# Patient Record
Sex: FEMALE | URBAN - METROPOLITAN AREA
[De-identification: names, ages, dates, MRNs, and addresses within clinical notes are randomized per-mention and may not be internally consistent; named-entity substitution may affect disease eponyms.]

---

## 2018-03-14 ENCOUNTER — EMERGENCY (EMERGENCY)
Facility: HOSPITAL | Age: 68
LOS: 1 days | Discharge: HOME | End: 2018-03-14
Attending: EMERGENCY MEDICINE
Payer: COMMERCIAL

## 2018-03-14 VITALS
SYSTOLIC BLOOD PRESSURE: 155 MMHG | TEMPERATURE: 98 F | OXYGEN SATURATION: 99 % | WEIGHT: 149.91 LBS | RESPIRATION RATE: 16 BRPM | HEART RATE: 81 BPM | DIASTOLIC BLOOD PRESSURE: 91 MMHG

## 2018-03-14 PROCEDURE — 99285 EMERGENCY DEPT VISIT HI MDM: CPT | Mod: 25

## 2018-03-15 VITALS
TEMPERATURE: 98 F | RESPIRATION RATE: 18 BRPM | SYSTOLIC BLOOD PRESSURE: 136 MMHG | HEART RATE: 80 BPM | DIASTOLIC BLOOD PRESSURE: 64 MMHG | OXYGEN SATURATION: 100 %

## 2018-03-15 LAB
ALBUMIN SERPL ELPH-MCNC: 3.7 G/DL — SIGNIFICANT CHANGE UP (ref 3.5–5)
ALP SERPL-CCNC: 65 U/L — SIGNIFICANT CHANGE UP (ref 40–120)
ALT FLD-CCNC: 16 U/L DA — SIGNIFICANT CHANGE UP (ref 10–60)
ANION GAP SERPL CALC-SCNC: 8 MMOL/L — SIGNIFICANT CHANGE UP (ref 5–17)
APTT BLD: 32.8 SEC — SIGNIFICANT CHANGE UP (ref 27.5–37.4)
AST SERPL-CCNC: 16 U/L — SIGNIFICANT CHANGE UP (ref 10–40)
BASOPHILS # BLD AUTO: 0.1 K/UL — SIGNIFICANT CHANGE UP (ref 0–0.2)
BASOPHILS NFR BLD AUTO: 0.8 % — SIGNIFICANT CHANGE UP (ref 0–2)
BILIRUB SERPL-MCNC: 0.2 MG/DL — SIGNIFICANT CHANGE UP (ref 0.2–1.2)
BUN SERPL-MCNC: 23 MG/DL — HIGH (ref 7–18)
CALCIUM SERPL-MCNC: 8.7 MG/DL — SIGNIFICANT CHANGE UP (ref 8.4–10.5)
CHLORIDE SERPL-SCNC: 105 MMOL/L — SIGNIFICANT CHANGE UP (ref 96–108)
CO2 SERPL-SCNC: 26 MMOL/L — SIGNIFICANT CHANGE UP (ref 22–31)
CREAT SERPL-MCNC: 0.91 MG/DL — SIGNIFICANT CHANGE UP (ref 0.5–1.3)
D DIMER BLD IA.RAPID-MCNC: 191 NG/ML DDU — SIGNIFICANT CHANGE UP
EOSINOPHIL # BLD AUTO: 0.1 K/UL — SIGNIFICANT CHANGE UP (ref 0–0.5)
EOSINOPHIL NFR BLD AUTO: 0.9 % — SIGNIFICANT CHANGE UP (ref 0–6)
GLUCOSE SERPL-MCNC: 102 MG/DL — HIGH (ref 70–99)
HCT VFR BLD CALC: 43.8 % — SIGNIFICANT CHANGE UP (ref 34.5–45)
HGB BLD-MCNC: 13.9 G/DL — SIGNIFICANT CHANGE UP (ref 11.5–15.5)
INR BLD: 1.06 RATIO — SIGNIFICANT CHANGE UP (ref 0.88–1.16)
LYMPHOCYTES # BLD AUTO: 1.6 K/UL — SIGNIFICANT CHANGE UP (ref 1–3.3)
LYMPHOCYTES # BLD AUTO: 22.7 % — SIGNIFICANT CHANGE UP (ref 13–44)
MCHC RBC-ENTMCNC: 29.1 PG — SIGNIFICANT CHANGE UP (ref 27–34)
MCHC RBC-ENTMCNC: 31.7 GM/DL — LOW (ref 32–36)
MCV RBC AUTO: 91.8 FL — SIGNIFICANT CHANGE UP (ref 80–100)
MONOCYTES # BLD AUTO: 0.5 K/UL — SIGNIFICANT CHANGE UP (ref 0–0.9)
MONOCYTES NFR BLD AUTO: 7.3 % — SIGNIFICANT CHANGE UP (ref 2–14)
NEUTROPHILS # BLD AUTO: 4.8 K/UL — SIGNIFICANT CHANGE UP (ref 1.8–7.4)
NEUTROPHILS NFR BLD AUTO: 68.2 % — SIGNIFICANT CHANGE UP (ref 43–77)
PLATELET # BLD AUTO: 198 K/UL — SIGNIFICANT CHANGE UP (ref 150–400)
POTASSIUM SERPL-MCNC: 4.1 MMOL/L — SIGNIFICANT CHANGE UP (ref 3.5–5.3)
POTASSIUM SERPL-SCNC: 4.1 MMOL/L — SIGNIFICANT CHANGE UP (ref 3.5–5.3)
PROT SERPL-MCNC: 7.6 G/DL — SIGNIFICANT CHANGE UP (ref 6–8.3)
PROTHROM AB SERPL-ACNC: 11.6 SEC — SIGNIFICANT CHANGE UP (ref 9.8–12.7)
RBC # BLD: 4.77 M/UL — SIGNIFICANT CHANGE UP (ref 3.8–5.2)
RBC # FLD: 12.7 % — SIGNIFICANT CHANGE UP (ref 10.3–14.5)
SODIUM SERPL-SCNC: 139 MMOL/L — SIGNIFICANT CHANGE UP (ref 135–145)
TROPONIN I SERPL-MCNC: <0.015 NG/ML — SIGNIFICANT CHANGE UP (ref 0–0.04)
TROPONIN I SERPL-MCNC: <0.015 NG/ML — SIGNIFICANT CHANGE UP (ref 0–0.04)
WBC # BLD: 7 K/UL — SIGNIFICANT CHANGE UP (ref 3.8–10.5)
WBC # FLD AUTO: 7 K/UL — SIGNIFICANT CHANGE UP (ref 3.8–10.5)

## 2018-03-15 PROCEDURE — 85730 THROMBOPLASTIN TIME PARTIAL: CPT

## 2018-03-15 PROCEDURE — 71046 X-RAY EXAM CHEST 2 VIEWS: CPT | Mod: 26

## 2018-03-15 PROCEDURE — 84484 ASSAY OF TROPONIN QUANT: CPT

## 2018-03-15 PROCEDURE — 85610 PROTHROMBIN TIME: CPT

## 2018-03-15 PROCEDURE — 80053 COMPREHEN METABOLIC PANEL: CPT

## 2018-03-15 PROCEDURE — 99284 EMERGENCY DEPT VISIT MOD MDM: CPT | Mod: 25

## 2018-03-15 PROCEDURE — 85379 FIBRIN DEGRADATION QUANT: CPT

## 2018-03-15 PROCEDURE — 71046 X-RAY EXAM CHEST 2 VIEWS: CPT

## 2018-03-15 PROCEDURE — 85027 COMPLETE CBC AUTOMATED: CPT

## 2018-03-15 PROCEDURE — 93005 ELECTROCARDIOGRAM TRACING: CPT

## 2018-03-15 RX ORDER — SODIUM CHLORIDE 9 MG/ML
3 INJECTION INTRAMUSCULAR; INTRAVENOUS; SUBCUTANEOUS ONCE
Qty: 0 | Refills: 0 | Status: COMPLETED | OUTPATIENT
Start: 2018-03-15 | End: 2018-03-15

## 2018-03-15 RX ADMIN — Medication 30 MILLILITER(S): at 05:51

## 2018-03-15 RX ADMIN — SODIUM CHLORIDE 3 MILLILITER(S): 9 INJECTION INTRAMUSCULAR; INTRAVENOUS; SUBCUTANEOUS at 01:14

## 2018-03-15 NOTE — ED PROVIDER NOTE - PROGRESS NOTE DETAILS
labs show neg trop x2, ddimer unremarkable, CXR unremarkable  on reeval patient reports pain only briefly recurs with movement, also reports acid tast in mouth the last few days. Given maalox. Offered admission for stress testing since last one many years ago but patient prefers to f/u with PMD later today for cardiology/GI referrals. Pt stable for discharge.

## 2018-03-15 NOTE — ED PROVIDER NOTE - MEDICAL DECISION MAKING DETAILS
68 y/o F pt with H/O HTN presents with chest pain which has now resolved. Will obtain labs, EKG, CXR and reassess

## 2018-03-15 NOTE — ED PROVIDER NOTE - CHPI ED SYMPTOMS NEG
no fever, no chills, no shortness of breath, no cough, no dysuria, no urinary frequency, no hematuria, no numbness, no tingling, no weakness, no leg swelling, no calf pain

## 2018-03-15 NOTE — ED PROVIDER NOTE - OBJECTIVE STATEMENT
66 y/o F pt with PMHx of asthma, HTN, presents to ED c/o Left sided chest pain x tonight 1400 which then resolved spontaneously. Pt reports that 1.5 hours PTA pain recurred and noted it to be more painful with deep inspiration. Pt notes having chest congestion last week and has been using Provair with some relief of his sxs. As per pt, in the last few days he has felt nausea, upset stomach and lose stools. Pt denies fever, chills, shortness of breath, cough, dysuria, urinary frequency, hematuria, numbness, tingling, weakness leg swelling, calf pain, or any other complaints. NKDA.

## 2018-03-15 NOTE — ED ADULT NURSE REASSESSMENT NOTE - NS ED NURSE REASSESS COMMENT FT1
Pt. verbalized improvement. No signs of distress noted. o complaints voiced. Pt. is stable for discharge.

## 2019-09-26 ENCOUNTER — INPATIENT (INPATIENT)
Facility: HOSPITAL | Age: 69
LOS: 0 days | Discharge: ANOTHER HOSPITAL | DRG: 305 | End: 2019-09-27
Attending: INTERNAL MEDICINE | Admitting: INTERNAL MEDICINE
Payer: MEDICARE

## 2019-09-26 VITALS
RESPIRATION RATE: 16 BRPM | HEIGHT: 66 IN | WEIGHT: 145.95 LBS | TEMPERATURE: 98 F | HEART RATE: 72 BPM | OXYGEN SATURATION: 97 % | DIASTOLIC BLOOD PRESSURE: 95 MMHG | SYSTOLIC BLOOD PRESSURE: 167 MMHG

## 2019-09-26 LAB
ALBUMIN SERPL ELPH-MCNC: 3.9 G/DL — SIGNIFICANT CHANGE UP (ref 3.5–5)
ALP SERPL-CCNC: 65 U/L — SIGNIFICANT CHANGE UP (ref 40–120)
ALT FLD-CCNC: 21 U/L DA — SIGNIFICANT CHANGE UP (ref 10–60)
ANION GAP SERPL CALC-SCNC: 5 MMOL/L — SIGNIFICANT CHANGE UP (ref 5–17)
AST SERPL-CCNC: 19 U/L — SIGNIFICANT CHANGE UP (ref 10–40)
BASOPHILS # BLD AUTO: 0.02 K/UL — SIGNIFICANT CHANGE UP (ref 0–0.2)
BASOPHILS NFR BLD AUTO: 0.3 % — SIGNIFICANT CHANGE UP (ref 0–2)
BILIRUB SERPL-MCNC: 0.3 MG/DL — SIGNIFICANT CHANGE UP (ref 0.2–1.2)
BUN SERPL-MCNC: 16 MG/DL — SIGNIFICANT CHANGE UP (ref 7–18)
CALCIUM SERPL-MCNC: 9.3 MG/DL — SIGNIFICANT CHANGE UP (ref 8.4–10.5)
CHLORIDE SERPL-SCNC: 105 MMOL/L — SIGNIFICANT CHANGE UP (ref 96–108)
CK SERPL-CCNC: 134 U/L — SIGNIFICANT CHANGE UP (ref 21–215)
CO2 SERPL-SCNC: 31 MMOL/L — SIGNIFICANT CHANGE UP (ref 22–31)
CREAT SERPL-MCNC: 1.02 MG/DL — SIGNIFICANT CHANGE UP (ref 0.5–1.3)
EOSINOPHIL # BLD AUTO: 0.02 K/UL — SIGNIFICANT CHANGE UP (ref 0–0.5)
EOSINOPHIL NFR BLD AUTO: 0.3 % — SIGNIFICANT CHANGE UP (ref 0–6)
GLUCOSE SERPL-MCNC: 102 MG/DL — HIGH (ref 70–99)
HCT VFR BLD CALC: 41.8 % — SIGNIFICANT CHANGE UP (ref 34.5–45)
HGB BLD-MCNC: 13.5 G/DL — SIGNIFICANT CHANGE UP (ref 11.5–15.5)
IMM GRANULOCYTES NFR BLD AUTO: 0.3 % — SIGNIFICANT CHANGE UP (ref 0–1.5)
LYMPHOCYTES # BLD AUTO: 1.24 K/UL — SIGNIFICANT CHANGE UP (ref 1–3.3)
LYMPHOCYTES # BLD AUTO: 16.4 % — SIGNIFICANT CHANGE UP (ref 13–44)
MCHC RBC-ENTMCNC: 29.7 PG — SIGNIFICANT CHANGE UP (ref 27–34)
MCHC RBC-ENTMCNC: 32.3 GM/DL — SIGNIFICANT CHANGE UP (ref 32–36)
MCV RBC AUTO: 91.9 FL — SIGNIFICANT CHANGE UP (ref 80–100)
MONOCYTES # BLD AUTO: 0.55 K/UL — SIGNIFICANT CHANGE UP (ref 0–0.9)
MONOCYTES NFR BLD AUTO: 7.3 % — SIGNIFICANT CHANGE UP (ref 2–14)
NEUTROPHILS # BLD AUTO: 5.73 K/UL — SIGNIFICANT CHANGE UP (ref 1.8–7.4)
NEUTROPHILS NFR BLD AUTO: 75.4 % — SIGNIFICANT CHANGE UP (ref 43–77)
NRBC # BLD: 0 /100 WBCS — SIGNIFICANT CHANGE UP (ref 0–0)
PLATELET # BLD AUTO: 201 K/UL — SIGNIFICANT CHANGE UP (ref 150–400)
POTASSIUM SERPL-MCNC: 4.2 MMOL/L — SIGNIFICANT CHANGE UP (ref 3.5–5.3)
POTASSIUM SERPL-SCNC: 4.2 MMOL/L — SIGNIFICANT CHANGE UP (ref 3.5–5.3)
PROT SERPL-MCNC: 7.3 G/DL — SIGNIFICANT CHANGE UP (ref 6–8.3)
RBC # BLD: 4.55 M/UL — SIGNIFICANT CHANGE UP (ref 3.8–5.2)
RBC # FLD: 13.2 % — SIGNIFICANT CHANGE UP (ref 10.3–14.5)
SODIUM SERPL-SCNC: 141 MMOL/L — SIGNIFICANT CHANGE UP (ref 135–145)
TROPONIN I SERPL-MCNC: <0.015 NG/ML — SIGNIFICANT CHANGE UP (ref 0–0.04)
WBC # BLD: 7.58 K/UL — SIGNIFICANT CHANGE UP (ref 3.8–10.5)
WBC # FLD AUTO: 7.58 K/UL — SIGNIFICANT CHANGE UP (ref 3.8–10.5)

## 2019-09-26 RX ORDER — LIDOCAINE 4 G/100G
10 CREAM TOPICAL ONCE
Refills: 0 | Status: COMPLETED | OUTPATIENT
Start: 2019-09-26 | End: 2019-09-26

## 2019-09-26 RX ORDER — FAMOTIDINE 10 MG/ML
20 INJECTION INTRAVENOUS ONCE
Refills: 0 | Status: COMPLETED | OUTPATIENT
Start: 2019-09-26 | End: 2019-09-26

## 2019-09-26 RX ADMIN — FAMOTIDINE 20 MILLIGRAM(S): 10 INJECTION INTRAVENOUS at 23:00

## 2019-09-26 RX ADMIN — LIDOCAINE 10 MILLILITER(S): 4 CREAM TOPICAL at 22:59

## 2019-09-26 RX ADMIN — Medication 30 MILLILITER(S): at 22:59

## 2019-09-26 NOTE — ED ADULT TRIAGE NOTE - CHIEF COMPLAINT QUOTE
referral from urgent care for evaluation of chest pain,pt. c/o chest pain since afternoon, was given 4 baby aspirin at urgent care

## 2019-09-26 NOTE — ED ADULT NURSE NOTE - OBJECTIVE STATEMENT
After I ate developed pain on my chest midsternal radiating up in the throat worst on lying flat denies SOB

## 2019-09-26 NOTE — ED PROVIDER NOTE - PROGRESS NOTE DETAILS
trop negative, pending 2nd troponin pending, then discharge with outpatient Gi and cardiology followup Tita: signed out to me for f/u repeat troponin. repeat trop .042 from <.015. cp has improved with meds and sitting up but given increase in trop will admit for cardiac workup.

## 2019-09-26 NOTE — ED PROVIDER NOTE - OBJECTIVE STATEMENT
68 y/o female with PMHx of HTN and asthma sent to the ED from urgent care to check cardiac enzymes. Pt states that she was eating a salad, a sandwich, and soup at [employer] when she started having burning chest pain. Pt took Tums to some relief, but pain returned. Pt had a CXR and EKG done at urgent care which were both normal. Pt was then sent to the ED to also have cardiac enzymes done.

## 2019-09-26 NOTE — ED PROVIDER NOTE - CLINICAL SUMMARY MEDICAL DECISION MAKING FREE TEXT BOX
Pt referred to the ED due to burning chest pain to check cardiac enzymes. Will check labs and reassess.

## 2019-09-27 ENCOUNTER — INPATIENT (INPATIENT)
Facility: HOSPITAL | Age: 69
LOS: 0 days | Discharge: HOME | End: 2019-09-28
Attending: INTERNAL MEDICINE | Admitting: INTERNAL MEDICINE
Payer: MEDICARE

## 2019-09-27 VITALS
DIASTOLIC BLOOD PRESSURE: 88 MMHG | SYSTOLIC BLOOD PRESSURE: 155 MMHG | OXYGEN SATURATION: 100 % | RESPIRATION RATE: 16 BRPM | TEMPERATURE: 98 F | HEART RATE: 80 BPM

## 2019-09-27 VITALS
SYSTOLIC BLOOD PRESSURE: 150 MMHG | RESPIRATION RATE: 15 BRPM | OXYGEN SATURATION: 100 % | TEMPERATURE: 98 F | DIASTOLIC BLOOD PRESSURE: 75 MMHG | HEART RATE: 67 BPM

## 2019-09-27 DIAGNOSIS — Z29.9 ENCOUNTER FOR PROPHYLACTIC MEASURES, UNSPECIFIED: ICD-10-CM

## 2019-09-27 DIAGNOSIS — R07.9 CHEST PAIN, UNSPECIFIED: ICD-10-CM

## 2019-09-27 DIAGNOSIS — I21.4 NON-ST ELEVATION (NSTEMI) MYOCARDIAL INFARCTION: ICD-10-CM

## 2019-09-27 DIAGNOSIS — J45.909 UNSPECIFIED ASTHMA, UNCOMPLICATED: ICD-10-CM

## 2019-09-27 DIAGNOSIS — K21.9 GASTRO-ESOPHAGEAL REFLUX DISEASE WITHOUT ESOPHAGITIS: ICD-10-CM

## 2019-09-27 DIAGNOSIS — I10 ESSENTIAL (PRIMARY) HYPERTENSION: ICD-10-CM

## 2019-09-27 LAB
ALBUMIN SERPL ELPH-MCNC: 3.8 G/DL — SIGNIFICANT CHANGE UP (ref 3.5–5)
ALP SERPL-CCNC: 64 U/L — SIGNIFICANT CHANGE UP (ref 40–120)
ALT FLD-CCNC: 20 U/L DA — SIGNIFICANT CHANGE UP (ref 10–60)
ANION GAP SERPL CALC-SCNC: 5 MMOL/L — SIGNIFICANT CHANGE UP (ref 5–17)
AST SERPL-CCNC: 19 U/L — SIGNIFICANT CHANGE UP (ref 10–40)
BASOPHILS # BLD AUTO: 0 K/UL — SIGNIFICANT CHANGE UP (ref 0–0.2)
BASOPHILS NFR BLD AUTO: 0 % — SIGNIFICANT CHANGE UP (ref 0–2)
BILIRUB SERPL-MCNC: 0.4 MG/DL — SIGNIFICANT CHANGE UP (ref 0.2–1.2)
BUN SERPL-MCNC: 12 MG/DL — SIGNIFICANT CHANGE UP (ref 7–18)
CALCIUM SERPL-MCNC: 9.1 MG/DL — SIGNIFICANT CHANGE UP (ref 8.4–10.5)
CHLORIDE SERPL-SCNC: 105 MMOL/L — SIGNIFICANT CHANGE UP (ref 96–108)
CHOLEST SERPL-MCNC: 203 MG/DL — HIGH (ref 10–199)
CK SERPL-CCNC: 116 U/L — SIGNIFICANT CHANGE UP (ref 21–215)
CO2 SERPL-SCNC: 29 MMOL/L — SIGNIFICANT CHANGE UP (ref 22–31)
CREAT SERPL-MCNC: 1.01 MG/DL — SIGNIFICANT CHANGE UP (ref 0.5–1.3)
EOSINOPHIL # BLD AUTO: 0 K/UL — SIGNIFICANT CHANGE UP (ref 0–0.5)
EOSINOPHIL NFR BLD AUTO: 0 % — SIGNIFICANT CHANGE UP (ref 0–6)
FOLATE SERPL-MCNC: >20 NG/ML — SIGNIFICANT CHANGE UP
GLUCOSE SERPL-MCNC: 170 MG/DL — HIGH (ref 70–99)
HBA1C BLD-MCNC: 5.3 % — SIGNIFICANT CHANGE UP (ref 4–5.6)
HCT VFR BLD CALC: 42 % — SIGNIFICANT CHANGE UP (ref 34.5–45)
HDLC SERPL-MCNC: 83 MG/DL — SIGNIFICANT CHANGE UP
HGB BLD-MCNC: 13.5 G/DL — SIGNIFICANT CHANGE UP (ref 11.5–15.5)
LIPID PNL WITH DIRECT LDL SERPL: 110 MG/DL — SIGNIFICANT CHANGE UP
LYMPHOCYTES # BLD AUTO: 0.55 K/UL — LOW (ref 1–3.3)
LYMPHOCYTES # BLD AUTO: 9 % — LOW (ref 13–44)
MAGNESIUM SERPL-MCNC: 2.2 MG/DL — SIGNIFICANT CHANGE UP (ref 1.6–2.6)
MANUAL SMEAR VERIFICATION: SIGNIFICANT CHANGE UP
MCHC RBC-ENTMCNC: 29.4 PG — SIGNIFICANT CHANGE UP (ref 27–34)
MCHC RBC-ENTMCNC: 32.1 GM/DL — SIGNIFICANT CHANGE UP (ref 32–36)
MCV RBC AUTO: 91.5 FL — SIGNIFICANT CHANGE UP (ref 80–100)
MONOCYTES # BLD AUTO: 0.49 K/UL — SIGNIFICANT CHANGE UP (ref 0–0.9)
MONOCYTES NFR BLD AUTO: 8 % — SIGNIFICANT CHANGE UP (ref 2–14)
NEUTROPHILS # BLD AUTO: 4.98 K/UL — SIGNIFICANT CHANGE UP (ref 1.8–7.4)
NEUTROPHILS NFR BLD AUTO: 81 % — HIGH (ref 43–77)
NRBC # BLD: 0 /100 — SIGNIFICANT CHANGE UP (ref 0–0)
PHOSPHATE SERPL-MCNC: 3.2 MG/DL — SIGNIFICANT CHANGE UP (ref 2.5–4.5)
PLAT MORPH BLD: NORMAL — SIGNIFICANT CHANGE UP
PLATELET # BLD AUTO: 208 K/UL — SIGNIFICANT CHANGE UP (ref 150–400)
POTASSIUM SERPL-MCNC: 4.1 MMOL/L — SIGNIFICANT CHANGE UP (ref 3.5–5.3)
POTASSIUM SERPL-SCNC: 4.1 MMOL/L — SIGNIFICANT CHANGE UP (ref 3.5–5.3)
PROT SERPL-MCNC: 7.1 G/DL — SIGNIFICANT CHANGE UP (ref 6–8.3)
RBC # BLD: 4.59 M/UL — SIGNIFICANT CHANGE UP (ref 3.8–5.2)
RBC # FLD: 13.2 % — SIGNIFICANT CHANGE UP (ref 10.3–14.5)
RBC BLD AUTO: NORMAL — SIGNIFICANT CHANGE UP
SODIUM SERPL-SCNC: 139 MMOL/L — SIGNIFICANT CHANGE UP (ref 135–145)
TOTAL CHOLESTEROL/HDL RATIO MEASUREMENT: 2.4 RATIO — LOW (ref 3.3–7.1)
TRIGL SERPL-MCNC: 52 MG/DL — SIGNIFICANT CHANGE UP (ref 10–149)
TROPONIN I SERPL-MCNC: 0.04 NG/ML — SIGNIFICANT CHANGE UP (ref 0–0.04)
TROPONIN I SERPL-MCNC: 0.92 NG/ML — HIGH (ref 0–0.04)
TSH SERPL-MCNC: 4.42 UU/ML — SIGNIFICANT CHANGE UP (ref 0.34–4.82)
VARIANT LYMPHS # BLD: 2 % — SIGNIFICANT CHANGE UP (ref 0–6)
VIT B12 SERPL-MCNC: 648 PG/ML — SIGNIFICANT CHANGE UP (ref 232–1245)
WBC # BLD: 6.15 K/UL — SIGNIFICANT CHANGE UP (ref 3.8–10.5)
WBC # FLD AUTO: 6.15 K/UL — SIGNIFICANT CHANGE UP (ref 3.8–10.5)

## 2019-09-27 PROCEDURE — 92941 PRQ TRLML REVSC TOT OCCL AMI: CPT | Mod: RC

## 2019-09-27 PROCEDURE — 93010 ELECTROCARDIOGRAM REPORT: CPT

## 2019-09-27 PROCEDURE — 82746 ASSAY OF FOLIC ACID SERUM: CPT

## 2019-09-27 PROCEDURE — 80061 LIPID PANEL: CPT

## 2019-09-27 PROCEDURE — 84443 ASSAY THYROID STIM HORMONE: CPT

## 2019-09-27 PROCEDURE — 83036 HEMOGLOBIN GLYCOSYLATED A1C: CPT

## 2019-09-27 PROCEDURE — 83735 ASSAY OF MAGNESIUM: CPT

## 2019-09-27 PROCEDURE — 93005 ELECTROCARDIOGRAM TRACING: CPT

## 2019-09-27 PROCEDURE — 36415 COLL VENOUS BLD VENIPUNCTURE: CPT

## 2019-09-27 PROCEDURE — 82607 VITAMIN B-12: CPT

## 2019-09-27 PROCEDURE — 96374 THER/PROPH/DIAG INJ IV PUSH: CPT

## 2019-09-27 PROCEDURE — 92978 ENDOLUMINL IVUS OCT C 1ST: CPT | Mod: 26,RC

## 2019-09-27 PROCEDURE — 99152 MOD SED SAME PHYS/QHP 5/>YRS: CPT

## 2019-09-27 PROCEDURE — 99285 EMERGENCY DEPT VISIT HI MDM: CPT | Mod: 25

## 2019-09-27 PROCEDURE — 80053 COMPREHEN METABOLIC PANEL: CPT

## 2019-09-27 PROCEDURE — 76937 US GUIDE VASCULAR ACCESS: CPT | Mod: 26

## 2019-09-27 PROCEDURE — 84100 ASSAY OF PHOSPHORUS: CPT

## 2019-09-27 PROCEDURE — 99222 1ST HOSP IP/OBS MODERATE 55: CPT

## 2019-09-27 PROCEDURE — 99285 EMERGENCY DEPT VISIT HI MDM: CPT

## 2019-09-27 PROCEDURE — 84484 ASSAY OF TROPONIN QUANT: CPT

## 2019-09-27 PROCEDURE — 93458 L HRT ARTERY/VENTRICLE ANGIO: CPT | Mod: 26,59

## 2019-09-27 PROCEDURE — 82550 ASSAY OF CK (CPK): CPT

## 2019-09-27 PROCEDURE — 85027 COMPLETE CBC AUTOMATED: CPT

## 2019-09-27 RX ORDER — TICAGRELOR 90 MG/1
90 TABLET ORAL EVERY 12 HOURS
Refills: 0 | Status: DISCONTINUED | OUTPATIENT
Start: 2019-09-28 | End: 2019-09-28

## 2019-09-27 RX ORDER — ENOXAPARIN SODIUM 100 MG/ML
40 INJECTION SUBCUTANEOUS DAILY
Refills: 0 | Status: DISCONTINUED | OUTPATIENT
Start: 2019-09-27 | End: 2019-09-27

## 2019-09-27 RX ORDER — LOSARTAN POTASSIUM 100 MG/1
50 TABLET, FILM COATED ORAL DAILY
Refills: 0 | Status: DISCONTINUED | OUTPATIENT
Start: 2019-09-27 | End: 2019-09-28

## 2019-09-27 RX ORDER — ATORVASTATIN CALCIUM 80 MG/1
80 TABLET, FILM COATED ORAL AT BEDTIME
Refills: 0 | Status: DISCONTINUED | OUTPATIENT
Start: 2019-09-27 | End: 2019-09-28

## 2019-09-27 RX ORDER — ALBUTEROL 90 UG/1
2 AEROSOL, METERED ORAL
Qty: 0 | Refills: 0 | DISCHARGE

## 2019-09-27 RX ORDER — MONTELUKAST 4 MG/1
10 TABLET, CHEWABLE ORAL DAILY
Refills: 0 | Status: DISCONTINUED | OUTPATIENT
Start: 2019-09-27 | End: 2019-09-28

## 2019-09-27 RX ORDER — ASPIRIN/CALCIUM CARB/MAGNESIUM 324 MG
81 TABLET ORAL DAILY
Refills: 0 | Status: DISCONTINUED | OUTPATIENT
Start: 2019-09-27 | End: 2019-09-27

## 2019-09-27 RX ORDER — SODIUM CHLORIDE 9 MG/ML
3 INJECTION INTRAMUSCULAR; INTRAVENOUS; SUBCUTANEOUS EVERY 8 HOURS
Refills: 0 | Status: DISCONTINUED | OUTPATIENT
Start: 2019-09-27 | End: 2019-09-28

## 2019-09-27 RX ORDER — MONTELUKAST 4 MG/1
1 TABLET, CHEWABLE ORAL
Qty: 0 | Refills: 0 | DISCHARGE

## 2019-09-27 RX ORDER — MONTELUKAST 4 MG/1
10 TABLET, CHEWABLE ORAL DAILY
Refills: 0 | Status: DISCONTINUED | OUTPATIENT
Start: 2019-09-27 | End: 2019-09-27

## 2019-09-27 RX ORDER — PANTOPRAZOLE SODIUM 20 MG/1
40 TABLET, DELAYED RELEASE ORAL
Refills: 0 | Status: DISCONTINUED | OUTPATIENT
Start: 2019-09-27 | End: 2019-09-27

## 2019-09-27 RX ORDER — ATORVASTATIN CALCIUM 80 MG/1
20 TABLET, FILM COATED ORAL AT BEDTIME
Refills: 0 | Status: DISCONTINUED | OUTPATIENT
Start: 2019-09-27 | End: 2019-09-27

## 2019-09-27 RX ORDER — SODIUM CHLORIDE 9 MG/ML
500 INJECTION INTRAMUSCULAR; INTRAVENOUS; SUBCUTANEOUS
Refills: 0 | Status: DISCONTINUED | OUTPATIENT
Start: 2019-09-27 | End: 2019-09-28

## 2019-09-27 RX ORDER — ASPIRIN/CALCIUM CARB/MAGNESIUM 324 MG
81 TABLET ORAL DAILY
Refills: 0 | Status: DISCONTINUED | OUTPATIENT
Start: 2019-09-28 | End: 2019-09-28

## 2019-09-27 RX ORDER — LOSARTAN POTASSIUM 100 MG/1
50 TABLET, FILM COATED ORAL DAILY
Refills: 0 | Status: DISCONTINUED | OUTPATIENT
Start: 2019-09-27 | End: 2019-09-27

## 2019-09-27 RX ORDER — METOPROLOL TARTRATE 50 MG
25 TABLET ORAL DAILY
Refills: 0 | Status: DISCONTINUED | OUTPATIENT
Start: 2019-09-27 | End: 2019-09-28

## 2019-09-27 RX ORDER — SUCRALFATE 1 G
1 TABLET ORAL EVERY 6 HOURS
Refills: 0 | Status: DISCONTINUED | OUTPATIENT
Start: 2019-09-27 | End: 2019-09-27

## 2019-09-27 RX ORDER — ASPIRIN/CALCIUM CARB/MAGNESIUM 324 MG
325 TABLET ORAL ONCE
Refills: 0 | Status: COMPLETED | OUTPATIENT
Start: 2019-09-27 | End: 2019-09-27

## 2019-09-27 RX ADMIN — LOSARTAN POTASSIUM 50 MILLIGRAM(S): 100 TABLET, FILM COATED ORAL at 06:38

## 2019-09-27 RX ADMIN — SODIUM CHLORIDE 3 MILLILITER(S): 9 INJECTION INTRAMUSCULAR; INTRAVENOUS; SUBCUTANEOUS at 21:51

## 2019-09-27 RX ADMIN — Medication 325 MILLIGRAM(S): at 02:24

## 2019-09-27 NOTE — H&P ADULT - PROBLEM SELECTOR PLAN 3
she takes monteleukast .  cw medication. patient takes losartan 50 mg at [facility].  cw with losartan .  -monitor Blood pressure.

## 2019-09-27 NOTE — H&P CARDIOLOGY - HISTORY OF PRESENT ILLNESS
70 y/o F w/ PMH of HTN and Asthma presented to [facility] with chest pain. Pt states that she has experienced two episodes of burning left sided chest pain after eating in the past week. Pt's pain radiates to her left shoulder. Pt's troponins trended up, <0.015 --> 0.042 --> 0.920, and she is now transferred to [facility] for cardiac catheretization. Patient has a family has of CAD with her her father having a fatal MI in his 40s and brother having an MI at 55. Pt denies N/V/D, fevers, chills, cough, palpitations, syncope, dyspnea on exertion, orthopnea, nocturnal paroxysmal dyspnea, edema, cyanosis, heart murmurs, varicosities, phlebitis, claudication.

## 2019-09-27 NOTE — TRANSFER ACCEPTANCE NOTE - HISTORY OF PRESENT ILLNESS
70 y/o F w/ PMH of HTN and Asthma presented to [facility] with chest pain. Pt states that she has experienced two episodes of burning left sided chest pain after eating in the past week. Pt's pain radiates to her left shoulder. Pt's troponins trended up, <0.015 --> 0.042 --> 0.920, and she is now transferred to [facility] for cardiac catheretization. Patient has a family has of CAD with her her father having a fatal MI in his 40s and brother having an MI at 55. Pt denies N/V/D, fevers, chills, cough, palpitations, syncope, dyspnea on exertion, orthopnea, nocturnal paroxysmal dyspnea, edema, cyanosis, heart murmurs, varicosities, phlebitis, claudication.

## 2019-09-27 NOTE — ACUTE INTERFACILITY TRANSFER NOTE - HOSPITAL COURSE
69 y o Female with medical history significant for  HTN and asthma presented with midsternal burning chest pain, radiating to neck. PT reports pain is intermittent for months now getting worse.   admitted with atypical chest pain for ischemic evaluation.  ACS protocol initiated   Troponin #1 negative, second troponin trending up  EKG with no ischemic changes  Currently patient chest pain free   Patient was evaluated by cardiology Dr Girard who recommended further evaluation with cardiac catheterization   Patient to be transferred to [facility]   Patient agrees with the plan

## 2019-09-27 NOTE — TRANSFER ACCEPTANCE NOTE - RS GEN PE MLT RESP DETAILS PC
no chest wall tenderness/breath sounds equal/good air movement/respirations non-labored/airway patent/clear to auscultation bilaterally

## 2019-09-27 NOTE — CONSULT NOTE ADULT - ATTENDING COMMENTS
Patient was seen and examined,interim events noted,labs and radiology studies reviewed.  Rip Girard MD,FACC.  [address] [place] [address].  [id number].  [phone]

## 2019-09-27 NOTE — ACUTE INTERFACILITY TRANSFER NOTE - PLAN OF CARE
resolution of symptoms Patient to be transferred to [facility] cath lab for catheterization with Dr YUE Olmstead

## 2019-09-27 NOTE — CONSULT NOTE ADULT - ASSESSMENT
69 y o Female wit PMH of HTN and asthma presented to Ed after she had chest pain , burning , in centre of chest , 9/10 , radiating towards neck ,      Problem/Plan - 1:  ·  Problem: Chest pain.  Plan: -EKG NSR.  T1 negative T2 trending up.  Ischemic work-up NST today      Problem/Plan - 2:  ·  Problem: GERD (gastroesophageal reflux disease).  Plan: had burning chest pain , after having a meal .   can be due to GERD.  - will start 40 mg protonix.     Problem/Plan - 3:  ·  Problem: HTN (hypertension).  Plan: patient takes losartan 50 mg at [facility].  cw with losartan .  -monitor Blood pressure.     Problem/Plan - 4:  ·  Problem: Asthma.  Plan: she takes monteleukast .  cw medication. 69 y o Female wit PMH of HTN and asthma presented to Ed after she had chest pain , burning , in centre of chest , 9/10 , radiating towards neck ,      Problem/Plan - 1:  ·  Problem: Chest pain.  Plan: -EKG NSR.  T1 negative T2 trending up.  Ischemic work-Cardiac cath [facility] today Dr Mari Olmstead    Problem/Plan - 2:  ·  Problem: GERD (gastroesophageal reflux disease).  Plan: had burning chest pain , after having a meal .   can be due to GERD.  - will start 40 mg protonix.     Problem/Plan - 3:  ·  Problem: HTN (hypertension).  Plan: patient takes losartan 50 mg at [facility].  cw with losartan .  -monitor Blood pressure.     Problem/Plan - 4:  ·  Problem: Asthma.  Plan: she takes monteleukast .  cw medication.

## 2019-09-27 NOTE — CONSULT NOTE ADULT - SUBJECTIVE AND OBJECTIVE BOX
CHIEF COMPLAINT:Chest pain    HPI:-69 y o Female with medical history significant for  HTN and asthma presented to ED after she had chest pain , burning , in centre of chest , 9/10 , radiating towards neck , aggravated by nothing. chest pain occured after she ate a salad , sandwich and soup at [employer] . she took tums but had relieve for only some time . chest pain re occured. she went to urgent care where her EKG and chest X ray were normal she was then sent to ED for further evaluation.  Remains chest pain free no previous cardiac work-up noted upward troponin trend      PAST MEDICAL & SURGICAL HISTORY:  Asthma  HTN (hypertension)  No significant past surgical history      MEDICATIONS  (STANDING):  aspirin enteric coated 81 milliGRAM(s) Oral daily  atorvastatin 20 milliGRAM(s) Oral at bedtime  enoxaparin Injectable 40 milliGRAM(s) SubCutaneous daily  losartan 50 milliGRAM(s) Oral daily  montelukast 10 milliGRAM(s) Oral daily  pantoprazole    Tablet 40 milliGRAM(s) Oral before breakfast    MEDICATIONS  (PRN):  sucralfate suspension 1 Gram(s) Oral every 6 hours PRN Reflux      FAMILY HISTORY:  FH: type 1 diabetes  FH: hypertension  No family history of premature coronary artery disease or sudden cardiac death    SOCIAL HISTORY:  Smoking-Non Smoker  Alcohol-Denies  Ilicit Drug use-Denies    REVIEW OF SYSTEMS:  Constitutional: [ ] fever, [ ]weight loss, [ ]fatigue Activity [ ] Bedbound,[x ] Ambulates [x ] Unassisted[ ] Cane/Walker [ ] Assistence.  Eyes: [ ] visual changes  Respiratory: [ ]shortness of breath;  [ ] cough, [ ]wheezing, [ ]chills, [ ]hemoptysis  Cardiovascular: [x ] chest pain, [ ]palpitations, [ ]dizziness,  [ ]leg swelling[ ]orthopnea [ ]PND  Gastrointestinal: [ ] abdominal pain, [ ]nausea, [ ]vomiting,  [ ]diarrhea,[ ]constipation  Genitourinary: [ ] dysuria, [ ] hematuria  Neurologic: [ ] headaches [ ] tremors[ ] weakness  Skin: [ ] itching, [ ]burning, [ ] rashes  Endocrine: [ ] heat or cold intolerance  Musculoskeletal: [ ] joint pain or swelling; [ ] muscle, back, or extremity pain  Psychiatric: [ ] depression, [ ]anxiety, [ ]mood swings, or [ ]difficulty sleeping  Hematologic: [ ] easy bruising, [ ] bleeding gums       [ x] All others negative	  [ ] Unable to obtain    Vital Signs Last 24 Hrs  T(C): 36.8 (27 Sep 2019 07:47), Max: 36.8 (27 Sep 2019 05:49)  T(F): 98.3 (27 Sep 2019 07:47), Max: 98.3 (27 Sep 2019 07:47)  HR: 82 (27 Sep 2019 07:47) (72 - 82)  BP: 153/74 (27 Sep 2019 07:47) (150/77 - 170/84)  RR: 18 (27 Sep 2019 07:47) (16 - 18)  SpO2: 99% (27 Sep 2019 07:47) (97% - 100%)  I&O's Summary      PHYSICAL EXAM:  General: No acute distress BMI-23.6  HEENT: EOMI, PERRL[ ] Icteric  Neck: Supple, No JVD  Lungs: Equal air entry bilaterally; [ ] Rales [ ] Rhonchi [ ] Wheezing  Heart: Regular rate and rhythm;[x ] Murmurs-   2/6 [x ] Systolic [ ] Diastolic [ ] Radiation,No rubs, or gallops  Abdomen: Nontender, bowel sounds present  Extremities: No clubbing, cyanosis, or edema[ ] Calf tenderness  Nervous system:  Alert & Oriented X3, no focal deficits  Psychiatric: Normal affect  Skin: No rashes or lesions      LABS:  09-26    141  |  105  |  16  ----------------------------<  102<H>  4.2   |  31  |  1.02    Ca    9.3      26 Sep 2019 20:51    TPro  7.3  /  Alb  3.9  /  TBili  0.3  /  DBili  x   /  AST  19  /  ALT  21  /  AlkPhos  65  09-26    Creatinine Trend: 1.02<--                        13.5   7.58  )-----------( 201      ( 26 Sep 2019 20:51 )             41.8         Lipid Panel:   Cardiac Enzymes: CARDIAC MARKERS ( 27 Sep 2019 00:51 )  0.042 ng/mL / x     / 116 U/L / x     / x      CARDIAC MARKERS ( 26 Sep 2019 20:51 )  <0.015 ng/mL / x     / 134 U/L / x     / x                RADIOLOGY: XR CHEST PA LAT 2V                IMPRESSION: Chronic apical thickening.    ECG [my interpretation:Sinus rhythm normal intervals no acute ST T wave abnormalities    TELEMETRY:Sinus rhythm CHIEF COMPLAINT:Chest pain    HPI:-69 y o Female with medical history significant for  HTN and asthma presented to ED after she had chest pain , burning , in centre of chest , 9/10 , radiating towards neck , aggravated by nothing. chest pain occured after she ate a salad , sandwich and soup at [employer] . she took tums but had relieve for only some time . chest pain re occured. she went to urgent care where her EKG and chest X ray were normal she was then sent to ED for further evaluation.  Still has substernal discomfort with jaw pain    PAST MEDICAL & SURGICAL HISTORY:  Asthma  HTN (hypertension)  No significant past surgical history      MEDICATIONS  (STANDING):  aspirin enteric coated 81 milliGRAM(s) Oral daily  atorvastatin 20 milliGRAM(s) Oral at bedtime  enoxaparin Injectable 40 milliGRAM(s) SubCutaneous daily  losartan 50 milliGRAM(s) Oral daily  montelukast 10 milliGRAM(s) Oral daily  pantoprazole    Tablet 40 milliGRAM(s) Oral before breakfast    MEDICATIONS  (PRN):  sucralfate suspension 1 Gram(s) Oral every 6 hours PRN Reflux      FAMILY HISTORY:  FH: type 1 diabetes  FH: hypertension  Family history of premature coronary artery disease   SOCIAL HISTORY:  Smoking-Non Smoker  Alcohol-Denies  Ilicit Drug use-Denies    REVIEW OF SYSTEMS:  Constitutional: [ ] fever, [ ]weight loss, [ ]fatigue Activity [ ] Bedbound,[x ] Ambulates [x ] Unassisted[ ] Cane/Walker [ ] Assistence.  Eyes: [ ] visual changes  Respiratory: [ ]shortness of breath;  [ ] cough, [ ]wheezing, [ ]chills, [ ]hemoptysis  Cardiovascular: [x ] chest pain, [ ]palpitations, [ ]dizziness,  [ ]leg swelling[ ]orthopnea [ ]PND  Gastrointestinal: [ ] abdominal pain, [ ]nausea, [ ]vomiting,  [ ]diarrhea,[ ]constipation  Genitourinary: [ ] dysuria, [ ] hematuria  Neurologic: [ ] headaches [ ] tremors[ ] weakness  Skin: [ ] itching, [ ]burning, [ ] rashes  Endocrine: [ ] heat or cold intolerance  Musculoskeletal: [ ] joint pain or swelling; [ ] muscle, back, or extremity pain  Psychiatric: [ ] depression, [ ]anxiety, [ ]mood swings, or [ ]difficulty sleeping  Hematologic: [ ] easy bruising, [ ] bleeding gums       [ x] All others negative	  [ ] Unable to obtain    Vital Signs Last 24 Hrs  T(C): 36.8 (27 Sep 2019 07:47), Max: 36.8 (27 Sep 2019 05:49)  T(F): 98.3 (27 Sep 2019 07:47), Max: 98.3 (27 Sep 2019 07:47)  HR: 82 (27 Sep 2019 07:47) (72 - 82)  BP: 153/74 (27 Sep 2019 07:47) (150/77 - 170/84)  RR: 18 (27 Sep 2019 07:47) (16 - 18)  SpO2: 99% (27 Sep 2019 07:47) (97% - 100%)  I&O's Summary      PHYSICAL EXAM:  General: No acute distress BMI-23.6  HEENT: EOMI, PERRL[ ] Icteric  Neck: Supple, No JVD  Lungs: Equal air entry bilaterally; [ ] Rales [ ] Rhonchi [ ] Wheezing  Heart: Regular rate and rhythm;[x ] Murmurs-   2/6 [x ] Systolic [ ] Diastolic [ ] Radiation,No rubs, or gallops  Abdomen: Nontender, bowel sounds present  Extremities: No clubbing, cyanosis, or edema[ ] Calf tenderness  Nervous system:  Alert & Oriented X3, no focal deficits  Psychiatric: Normal affect  Skin: No rashes or lesions      LABS:  09-26    141  |  105  |  16  ----------------------------<  102<H>  4.2   |  31  |  1.02    Ca    9.3      26 Sep 2019 20:51    TPro  7.3  /  Alb  3.9  /  TBili  0.3  /  DBili  x   /  AST  19  /  ALT  21  /  AlkPhos  65  09-26    Creatinine Trend: 1.02<--                        13.5   7.58  )-----------( 201      ( 26 Sep 2019 20:51 )             41.8         Lipid Panel:   Cardiac Enzymes: CARDIAC MARKERS ( 27 Sep 2019 00:51 )  0.042 ng/mL / x     / 116 U/L / x     / x      CARDIAC MARKERS ( 26 Sep 2019 20:51 )  <0.015 ng/mL / x     / 134 U/L / x     / x                RADIOLOGY: XR CHEST PA LAT 2V                IMPRESSION: Chronic apical thickening.    ECG [my interpretation:Sinus rhythm normal intervals no acute ST T wave abnormalities    TELEMETRY:Sinus rhythm

## 2019-09-27 NOTE — TRANSFER ACCEPTANCE NOTE - ASSESSMENT
70 y/o F w/ PMH of HTN and Asthma [facility] Pt states that she has experienced two episodes of burning left sided chest pain after eating in the past week    +NSTEMI

## 2019-09-27 NOTE — TRANSFER ACCEPTANCE NOTE - NEGATIVE GASTROINTESTINAL SYMPTOMS
no melena/no jaundice/no flatulence/no abdominal pain/no diarrhea/no nausea/no vomiting/no steatorrhea/no constipation/no change in bowel habits/no hematochezia/no hiccoughs

## 2019-09-27 NOTE — TRANSFER ACCEPTANCE NOTE - MUSCULOSKELETAL
detailed exam no joint warmth/no calf tenderness/ROM intact/normal strength/no joint swelling/no joint erythema details…

## 2019-09-27 NOTE — H&P ADULT - ATTENDING COMMENTS
Patient seen and examined ; case was discussed with the admitting resident    ROS: as in the HPI; all other ROS negative    SH and family history as above    Vital Signs Last 24 Hrs  T(C): 36.8 (27 Sep 2019 07:47), Max: 36.8 (27 Sep 2019 05:49)  T(F): 98.3 (27 Sep 2019 07:47), Max: 98.3 (27 Sep 2019 07:47)  HR: 82 (27 Sep 2019 07:47) (72 - 82)  BP: 153/74 (27 Sep 2019 07:47) (150/77 - 170/84)  BP(mean): --  RR: 18 (27 Sep 2019 07:47) (16 - 18)  SpO2: 99% (27 Sep 2019 07:47) (97% - 100%)    GEN: NAD, appears younger than stated age   HEENT- normocephalic; mouth moist  CVS- S1S2+  LUNGS- clear to auscultation; no wheezing  ABD: Soft , nontender, nondistended, Bowel sounds are present  EXTREMITY: no calf tenderness, no cyanosis, no edema  NEURO: AAOx3; non focal neurologic exam; cranial nerves grossly intact  PSYCH: normal affect and behavior  BACK: no swelling or mass;   VASCULAR: ++ distal peripheral pulses  SKIN: warm and dry.       Labs Reviewed:                         13.5   7.58  )-----------( 201      ( 26 Sep 2019 20:51 )             41.8     09-26    141  |  105  |  16  ----------------------------<  102<H>  4.2   |  31  |  1.02    Ca    9.3      26 Sep 2019 20:51    TPro  7.3  /  Alb  3.9  /  TBili  0.3  /  DBili  x   /  AST  19  /  ALT  21  /  AlkPhos  65  09-26    CARDIAC MARKERS ( 27 Sep 2019 00:51 )  0.042 ng/mL / x     / 116 U/L / x     / x      CARDIAC MARKERS ( 26 Sep 2019 20:51 )  <0.015 ng/mL / x     / 134 U/L / x     / x              BNP:   MEDICATIONS  (STANDING):  aspirin enteric coated 81 milliGRAM(s) Oral daily  atorvastatin 20 milliGRAM(s) Oral at bedtime  enoxaparin Injectable 40 milliGRAM(s) SubCutaneous daily  losartan 50 milliGRAM(s) Oral daily  montelukast 10 milliGRAM(s) Oral daily  pantoprazole    Tablet 40 milliGRAM(s) Oral before breakfast    MEDICATIONS  (PRN):      CXR reviewed  EKG Reviewed- septal q waves       70 y/o F with HTN admitted with atypical chest pain. Never had a LHC but has had negative NST years ago. Atypical burning sensation over precordium has been present on and off for years but recently much worse, patient has also been under significant personal stressors as well.     1. Atypical chest pain- elevated risk for CAD given ext family hx and age- Repeat troponin x1, echo to eval wma, monitor tele, asa and statin.  2. HTN  3. Asthma- mild intermittent. Cont Singulair, prn albuterol for rescue, no exacerbation   4. Gastritis- cont ppi and start Carafate prn, recommended GI o/p f/u         Plan of care discussed with patient ;  all questions and concerns were addressed.

## 2019-09-27 NOTE — TRANSFER ACCEPTANCE NOTE - NEGATIVE CARDIOVASCULAR SYMPTOMS
no peripheral edema/no claudication/no dyspnea on exertion/no paroxysmal nocturnal dyspnea/no orthopnea/no palpitations

## 2019-09-27 NOTE — H&P ADULT - HISTORY OF PRESENT ILLNESS
69 y o Female wit PMH of HTN and asthma presented to Ed after she had chest pain , burning , in centre of chest , 9/10 , radiating towards neck , aggravated by nothing. chest pain occured after she ate a salad , sandwich and soup at [employer] . she took tums but had relieve for only some time . chest pain re occured. she went to urgent care where her EKG and chest X ray were normal she was then sent to ED for further evaluation. 69 y o Female with medical history significant for  HTN and asthma presented to Ed after she had chest pain , burning , in centre of chest , 9/10 , radiating towards neck , aggravated by nothing. chest pain occured after she ate a salad , sandwich and soup at [employer] . she took tums but had relieve for only some time . chest pain re occured. she went to urgent care where her EKG and chest X ray were normal she was then sent to ED for further evaluation.

## 2019-09-27 NOTE — TRANSFER ACCEPTANCE NOTE - NEGATIVE MUSCULOSKELETAL SYMPTOMS
no myalgia/no muscle cramps/no arthralgia/no muscle weakness/no joint swelling/no stiffness/no neck pain/no back pain/no arthritis

## 2019-09-27 NOTE — H&P ADULT - PROBLEM SELECTOR PLAN 1
-EKG NSR.  T1 negative , follow T2 and T3.  monitor on tele for 24 hours.  -Echo. -EKG NSR.  T1 negative , follow T2 and T3.  monitor on tele for 24 hours.  -Echo.  -started her on aspirin and lipitor .   -chest pain looked more like robson so not starting her on b-blocker.  -dr Grullon . cardiologist

## 2019-09-27 NOTE — H&P ADULT - PROBLEM SELECTOR PLAN 4
had burning chest pain , after having a meal .   can be due to gerd.  - will start 40 mg protonix. she takes monteleukast .  cw medication.

## 2019-09-27 NOTE — TRANSFER ACCEPTANCE NOTE - NEGATIVE NEUROLOGICAL SYMPTOMS
no generalized seizures/no facial palsy/no syncope/no focal seizures/no paresthesias/no tremors/no vertigo/no loss of sensation/no headache/no confusion/no hemiparesis/no transient paralysis/no weakness/no difficulty walking/no loss of consciousness

## 2019-09-27 NOTE — H&P CARDIOLOGY - FAMILY HISTORY
FH: type 1 diabetes     Father  Still living? Unknown  FH: hypertension, Age at diagnosis: Age Unknown

## 2019-09-27 NOTE — H&P ADULT - PROBLEM SELECTOR PLAN 2
patient takes losartan 50 mg at [facility].  cw with losartan .  -monitor Blood pressure. had burning chest pain , after having a meal .   can be due to GERD.  - will start 40 mg protonix.

## 2019-09-27 NOTE — H&P ADULT - ASSESSMENT
69 y o Female wit PMH of HTN and asthma presented to Ed after she had chest pain , burning , in centre of chest , 9/10 , radiating towards neck , aggravated by nothing. chest pain occured after she ate a salad , sandwich and soup at [employer] . she took tums but had relieve for only some time . chest pain re occured. she went to urgent care where her EKG and chest X ray were normal she was then sent to ED for further evaluation.    she is being admitted to tele floor for ACS rule out.  t 1 negative .

## 2019-09-27 NOTE — H&P ADULT - PROBLEM SELECTOR PLAN 5
IMPROVE VTE Individual Risk Assessment  RISK                                                                Points  [  ] Previous VTE                                                  3  [  ] Thrombophilia                                               2  [  ] Lower limb paralysis                                      2        (unable to hold up >15 seconds)    [  ] Current Cancer                                              2         (within 6 months)  [x  ] Immobilization > 24 hrs                                1  [  ] ICU/CCU stay > 24 hours                              1  [x  ] Age > 60                                                      1  IMPROVE VTE Score : 2.  on lovenox 40 mg

## 2019-09-28 ENCOUNTER — TRANSCRIPTION ENCOUNTER (OUTPATIENT)
Age: 69
End: 2019-09-28

## 2019-09-28 VITALS
SYSTOLIC BLOOD PRESSURE: 146 MMHG | DIASTOLIC BLOOD PRESSURE: 87 MMHG | OXYGEN SATURATION: 98 % | RESPIRATION RATE: 16 BRPM | HEART RATE: 78 BPM

## 2019-09-28 LAB
ANION GAP SERPL CALC-SCNC: 9 MMO/L — SIGNIFICANT CHANGE UP (ref 7–14)
BUN SERPL-MCNC: 13 MG/DL — SIGNIFICANT CHANGE UP (ref 7–23)
CALCIUM SERPL-MCNC: 9 MG/DL — SIGNIFICANT CHANGE UP (ref 8.4–10.5)
CHLORIDE SERPL-SCNC: 104 MMOL/L — SIGNIFICANT CHANGE UP (ref 98–107)
CO2 SERPL-SCNC: 26 MMOL/L — SIGNIFICANT CHANGE UP (ref 22–31)
CREAT SERPL-MCNC: 0.96 MG/DL — SIGNIFICANT CHANGE UP (ref 0.5–1.3)
GLUCOSE SERPL-MCNC: 104 MG/DL — HIGH (ref 70–99)
HCT VFR BLD CALC: 43.9 % — SIGNIFICANT CHANGE UP (ref 34.5–45)
HGB BLD-MCNC: 13.8 G/DL — SIGNIFICANT CHANGE UP (ref 11.5–15.5)
MAGNESIUM SERPL-MCNC: 2.1 MG/DL — SIGNIFICANT CHANGE UP (ref 1.6–2.6)
MCHC RBC-ENTMCNC: 28.9 PG — SIGNIFICANT CHANGE UP (ref 27–34)
MCHC RBC-ENTMCNC: 31.4 % — LOW (ref 32–36)
MCV RBC AUTO: 91.8 FL — SIGNIFICANT CHANGE UP (ref 80–100)
NRBC # FLD: 0 K/UL — SIGNIFICANT CHANGE UP (ref 0–0)
PHOSPHATE SERPL-MCNC: 4.4 MG/DL — SIGNIFICANT CHANGE UP (ref 2.5–4.5)
PLATELET # BLD AUTO: 196 K/UL — SIGNIFICANT CHANGE UP (ref 150–400)
PMV BLD: 10.5 FL — SIGNIFICANT CHANGE UP (ref 7–13)
POTASSIUM SERPL-MCNC: 4.6 MMOL/L — SIGNIFICANT CHANGE UP (ref 3.5–5.3)
POTASSIUM SERPL-SCNC: 4.6 MMOL/L — SIGNIFICANT CHANGE UP (ref 3.5–5.3)
RBC # BLD: 4.78 M/UL — SIGNIFICANT CHANGE UP (ref 3.8–5.2)
RBC # FLD: 13.2 % — SIGNIFICANT CHANGE UP (ref 10.3–14.5)
SODIUM SERPL-SCNC: 139 MMOL/L — SIGNIFICANT CHANGE UP (ref 135–145)
WBC # BLD: 6.41 K/UL — SIGNIFICANT CHANGE UP (ref 3.8–10.5)
WBC # FLD AUTO: 6.41 K/UL — SIGNIFICANT CHANGE UP (ref 3.8–10.5)

## 2019-09-28 PROCEDURE — 93306 TTE W/DOPPLER COMPLETE: CPT | Mod: 26

## 2019-09-28 RX ORDER — LOSARTAN POTASSIUM 100 MG/1
1 TABLET, FILM COATED ORAL
Qty: 30 | Refills: 0
Start: 2019-09-28 | End: 2019-10-27

## 2019-09-28 RX ORDER — LOSARTAN POTASSIUM 100 MG/1
2 TABLET, FILM COATED ORAL
Qty: 60 | Refills: 3
Start: 2019-09-28 | End: 2020-01-25

## 2019-09-28 RX ORDER — LOSARTAN POTASSIUM 100 MG/1
1 TABLET, FILM COATED ORAL
Qty: 0 | Refills: 0 | DISCHARGE

## 2019-09-28 RX ORDER — TICAGRELOR 90 MG/1
1 TABLET ORAL
Qty: 180 | Refills: 0
Start: 2019-09-28 | End: 2019-12-26

## 2019-09-28 RX ORDER — LOSARTAN POTASSIUM 100 MG/1
100 TABLET, FILM COATED ORAL DAILY
Refills: 0 | Status: DISCONTINUED | OUTPATIENT
Start: 2019-09-28 | End: 2019-09-28

## 2019-09-28 RX ORDER — ASPIRIN/CALCIUM CARB/MAGNESIUM 324 MG
1 TABLET ORAL
Qty: 90 | Refills: 0
Start: 2019-09-28 | End: 2019-12-26

## 2019-09-28 RX ORDER — METOPROLOL TARTRATE 50 MG
1 TABLET ORAL
Qty: 30 | Refills: 0
Start: 2019-09-28 | End: 2019-10-27

## 2019-09-28 RX ORDER — TICAGRELOR 90 MG/1
1 TABLET ORAL
Qty: 60 | Refills: 3
Start: 2019-09-28 | End: 2020-01-25

## 2019-09-28 RX ORDER — METOPROLOL TARTRATE 50 MG
1 TABLET ORAL
Qty: 30 | Refills: 3
Start: 2019-09-28 | End: 2020-01-25

## 2019-09-28 RX ORDER — ROSUVASTATIN CALCIUM 5 MG/1
1 TABLET ORAL
Qty: 30 | Refills: 0
Start: 2019-09-28 | End: 2019-10-27

## 2019-09-28 RX ORDER — CLOPIDOGREL BISULFATE 75 MG/1
1 TABLET, FILM COATED ORAL
Qty: 90 | Refills: 0
Start: 2019-09-28 | End: 2019-12-26

## 2019-09-28 RX ORDER — ATORVASTATIN CALCIUM 80 MG/1
1 TABLET, FILM COATED ORAL
Qty: 30 | Refills: 3
Start: 2019-09-28 | End: 2020-01-25

## 2019-09-28 RX ORDER — PANTOPRAZOLE SODIUM 20 MG/1
1 TABLET, DELAYED RELEASE ORAL
Qty: 30 | Refills: 0
Start: 2019-09-28 | End: 2019-10-27

## 2019-09-28 RX ORDER — ATORVASTATIN CALCIUM 80 MG/1
1 TABLET, FILM COATED ORAL
Qty: 30 | Refills: 0
Start: 2019-09-28 | End: 2019-10-27

## 2019-09-28 RX ORDER — PANTOPRAZOLE SODIUM 20 MG/1
40 TABLET, DELAYED RELEASE ORAL
Refills: 0 | Status: DISCONTINUED | OUTPATIENT
Start: 2019-09-28 | End: 2019-09-28

## 2019-09-28 RX ORDER — LOSARTAN POTASSIUM 100 MG/1
50 TABLET, FILM COATED ORAL DAILY
Refills: 0 | Status: DISCONTINUED | OUTPATIENT
Start: 2019-09-28 | End: 2019-09-28

## 2019-09-28 RX ORDER — CLOPIDOGREL BISULFATE 75 MG/1
75 TABLET, FILM COATED ORAL DAILY
Refills: 0 | Status: DISCONTINUED | OUTPATIENT
Start: 2019-09-28 | End: 2019-09-28

## 2019-09-28 RX ORDER — CLOPIDOGREL BISULFATE 75 MG/1
1 TABLET, FILM COATED ORAL
Qty: 30 | Refills: 0
Start: 2019-09-28 | End: 2019-10-27

## 2019-09-28 RX ORDER — INFLUENZA VIRUS VACCINE 15; 15; 15; 15 UG/.5ML; UG/.5ML; UG/.5ML; UG/.5ML
0.5 SUSPENSION INTRAMUSCULAR ONCE
Refills: 0 | Status: COMPLETED | OUTPATIENT
Start: 2019-09-28 | End: 2019-09-28

## 2019-09-28 RX ADMIN — LOSARTAN POTASSIUM 50 MILLIGRAM(S): 100 TABLET, FILM COATED ORAL at 06:11

## 2019-09-28 RX ADMIN — SODIUM CHLORIDE 3 MILLILITER(S): 9 INJECTION INTRAMUSCULAR; INTRAVENOUS; SUBCUTANEOUS at 14:00

## 2019-09-28 RX ADMIN — Medication 81 MILLIGRAM(S): at 12:01

## 2019-09-28 RX ADMIN — SODIUM CHLORIDE 3 MILLILITER(S): 9 INJECTION INTRAMUSCULAR; INTRAVENOUS; SUBCUTANEOUS at 06:03

## 2019-09-28 RX ADMIN — CLOPIDOGREL BISULFATE 75 MILLIGRAM(S): 75 TABLET, FILM COATED ORAL at 17:59

## 2019-09-28 RX ADMIN — TICAGRELOR 90 MILLIGRAM(S): 90 TABLET ORAL at 06:11

## 2019-09-28 RX ADMIN — INFLUENZA VIRUS VACCINE 0.5 MILLILITER(S): 15; 15; 15; 15 SUSPENSION INTRAMUSCULAR at 17:58

## 2019-09-28 NOTE — PROGRESS NOTE ADULT - SUBJECTIVE AND OBJECTIVE BOX
PRESENTING CC:Chest pain-NSTEMI    SUBJ: 69 y o Female with medical history significant for  HTN and asthma presented to ED after she had chest pain , burning , in centre of chest , 9/10 , radiating towards neck noted elevated troponins had cath-mRCA stenosis s/p PCI-BAIRON,no further discomfort noted      PMH -reviewed admission note, no change since admission  Heart failure: acute [ ] chronic [ ] acute or chronic [ ] diastolic [ ] systolic [ ] combined systolic and diastolic[ ]  ALBERTINA: ATN[ ] renal medullary necrosis [ ] CKD I [ ]CKDII [ ]CKD III [ ]CKD IV [ ]CKD V [ ]Other pathological lesions [ ]    MEDICATIONS  (STANDING):  aspirin enteric coated 81 milliGRAM(s) Oral daily  atorvastatin 80 milliGRAM(s) Oral at bedtime  losartan 50 milliGRAM(s) Oral daily  metoprolol succinate ER 25 milliGRAM(s) Oral daily  montelukast 10 milliGRAM(s) Oral daily  sodium chloride 0.9% lock flush 3 milliLiter(s) IV Push every 8 hours  sodium chloride 0.9%. 500 milliLiter(s) (75 mL/Hr) IV Continuous <Continuous>  ticagrelor 90 milliGRAM(s) Oral every 12 hours      FAMILY HISTORY:  FH: type 1 diabetes  FH: hypertension (Father)  Family history of premature coronary artery disease     REVIEW OF SYSTEMS:  Constitutional: [ ] fever, [ ]weight loss,  [ ]fatigue  Eyes: [ ] visual changes  Respiratory: [ ]shortness of breath;  [ ] cough, [ ]wheezing, [ ]chills, [ ]hemoptysis  Cardiovascular: [x ] chest pain, [ ]palpitations, [ ]dizziness,  [ ]leg swelling[ ]orthopnea[ ]PND  Gastrointestinal: [ ] abdominal pain, [ ]nausea, [ ]vomiting,  [ ]diarrhea   Genitourinary: [ ] dysuria, [ ] hematuria  Neurologic: [ ] headaches [ ] tremors[ ]weakness  Skin: [ ] itching, [ ]burning, [ ] rashes  Endocrine: [ ] heat or cold intolerance  Musculoskeletal: [ ] joint pain or swelling; [ ] muscle, back, or extremity pain  Psychiatric: [ ] depression, [ ]anxiety, [ ]mood swings, or [ ]difficulty sleeping  Hematologic: [ ] easy bruising, [ ] bleeding gums    [x] All remaining systems negative except as per above.   [ ]Unable to obtain.    Vital Signs Last 24 Hrs  T(C): 37 (28 Sep 2019 05:55), Max: 37 (28 Sep 2019 05:55)  T(F): 98.6 (28 Sep 2019 05:55), Max: 98.6 (28 Sep 2019 05:55)  HR: 76 (28 Sep 2019 05:55) (67 - 81)  BP: 154/89 (28 Sep 2019 05:55) (150/75 - 157/91)  RR: 17 (28 Sep 2019 05:55) (15 - 17)  SpO2: 98% (28 Sep 2019 05:55) (98% - 100%)  I&O's Summary      PHYSICAL EXAM:  General: No acute distress BMI-28  HEENT: EOMI, PERRL  Neck: Supple, [ ] JVD  Lungs: Equal air entry bilaterally; [ ] rales [ ] wheezing [ ] rhonchi  Heart: Regular rate and rhythm; [x ] murmur  2 /6 [x ] systolic [ ] diastolic [ ] radiation[ ] rubs [ ]  gallops  Abdomen: Nontender, bowel sounds present  Extremities: No clubbing, cyanosis, [ ] edema  Nervous system:  Alert & Oriented X3, no focal deficits  Psychiatric: Normal affect  Skin: No rashes or lesions    LABS:  09-28    139  |  104  |  13  ----------------------------<  104<H>  4.6   |  26  |  0.96    Ca    9.0      28 Sep 2019 05:45  Phos  4.4     09-28  Mg     2.1     09-28    TPro  7.1  /  Alb  3.8  /  TBili  0.4  /  DBili  x   /  AST  19  /  ALT  20  /  AlkPhos  64  09-27    Creatinine Trend: 0.96<--, 1.01<--, 1.02<--                        13.8   6.41  )-----------( 196      ( 28 Sep 2019 05:45 )             43.9       Lipid Panel:   Cardiac Enzymes: CARDIAC MARKERS ( 27 Sep 2019 10:57 )  0.920 ng/mL / x     / x     / x     / x      CARDIAC MARKERS ( 27 Sep 2019 00:51 )  0.042 ng/mL / x     / 116 U/L / x     / x      CARDIAC MARKERS ( 26 Sep 2019 20:51 )  <0.015 ng/mL / x     / 134 U/L / x     / x            CATHETERIZATION:Study date: 09/27/2019DIAGNOSTIC IMPRESSIONS: Successful PCI to severe stenosis of RCA using 4.0mm Alvaro BAIRON  Severe stenosis of small septal branch , small vessel best treated medically   There is significant single vessel coronary artery disease.      IMPRESSION AND PLAN:      69 y o Female wit PMH of HTN and asthma presented to Ed after she had chest pain , burning , in centre of chest , 9/10 , radiating towards neck ,      Problem/Plan - 1:  ·  Problem: Chest pain.  Plan: -EKG NSR.  NSTEMI  S/P PCI-BAIRON RCA  Continue DAPT    Problem/Plan - 2:  ·  Problem: GERD (gastroesophageal reflux disease).  Plan: had burning chest pain , after having a meal .   can be due to GERD.  - will start 40 mg protonix.     Problem/Plan - 3:  ·  Problem: HTN (hypertension).  Plan: patient takes losartan 50 mg at hoem.  cw with losartan -100 mg daily  -monitor Blood pressure.     Problem/Plan - 4:  ·  Problem: Asthma.  Plan: she takes monteleukast .  cw medication.

## 2019-09-28 NOTE — DISCHARGE NOTE PROVIDER - NSDCQMBETAOTHER_CARD_A_CORE_FT
pt refuse because this medication makes her anxious, after discussion with cardiology they agree on keeping her only on losartan

## 2019-09-28 NOTE — DISCHARGE NOTE PROVIDER - CARE PROVIDER_API CALL
Rip Girard)  Cardiology  [address] [id number]  Phone: [phone]  Fax: [phone]  Follow Up Time:

## 2019-09-28 NOTE — CHART NOTE - NSCHARTNOTEFT_GEN_A_CORE
pt concern about starting multiple new meds, would like to take losartan 50mg for now until see Dr Girard in office.

## 2019-09-28 NOTE — CHART NOTE - NSCHARTNOTEFT_GEN_A_CORE
LUIS ANGEL PEREA  [id number]    Patient status post cath via right femoral artery. Dressing noted to be saturated with blood. Pt denies any dizziness, back or abdominal pain. Dressing removed, minimal bleeding noted from site. Pressure applied for approx 10 minutes at bedside with resolution of bleed. Area cleaned, new dressing applied. Pressure bag also applied. Recommended for pt to avoid getting up or walking around until AM. Pulses present, capillary refill appropriate. Without hematoma. RN made aware to periodically check dressing. Will continue to follow closely.     Vital Signs Last 24 Hrs  T(C): 36.8 (27 Sep 2019 19:15), Max: 36.8 (27 Sep 2019 05:49)  T(F): 98.3 (27 Sep 2019 19:15), Max: 98.3 (27 Sep 2019 07:47)  HR: 81 (27 Sep 2019 21:48) (67 - 82)  BP: 157/91 (27 Sep 2019 21:48) (150/75 - 170/84)  BP(mean): --  RR: 15 (27 Sep 2019 19:15) (15 - 18)  SpO2: 100% (27 Sep 2019 19:15) (97% - 100%)    SONY Collins PA-C  [id number]

## 2019-09-28 NOTE — DISCHARGE NOTE NURSING/CASE MANAGEMENT/SOCIAL WORK - PATIENT PORTAL LINK FT
You can access the FollowMyHealth Patient Portal offered by [facility] by registering at the following website: [email or web address]. By joining [facility]’s FollowMyHealth portal, you will also be able to view your health information using other applications (apps) compatible with our system.

## 2019-09-28 NOTE — DISCHARGE NOTE PROVIDER - NSDCCPCAREPLAN_GEN_ALL_CORE_FT
PRINCIPAL DISCHARGE DIAGNOSIS  Diagnosis: NSTEMI (non-ST elevated myocardial infarction)  Assessment and Plan of Treatment: continue asa and brilinta. No heavy lifting x one week. No strenuous activity x 3 weeks. Monitor site of procedure and notify your doctor for any redness, swelling, discharge. No driving x 24 hours. You may shower but no baths or swimming x one week. you had stent placed this admission. Followup with Dr Girard in 1 week      SECONDARY DISCHARGE DIAGNOSES  Diagnosis: Stable asthma  Assessment and Plan of Treatment: continue inhaler as directed    Diagnosis: Benign essential HTN  Assessment and Plan of Treatment: Low sodium and fat diet, continue anti-hypertensive medications, and follow up with primary care physician.

## 2019-09-28 NOTE — DISCHARGE NOTE PROVIDER - HOSPITAL COURSE
70 y/o F w/ PMH of HTN and Asthma presented to [facility] with chest pain        Hospital course:    LHC: ostial S1 90%, mRCA 70% x1 BAIRON, EF 50%, RFA accessed and sealed w/ Perclose. Pt reported side effect with lipitor and metoprolol. Spoke with Dr Girard will discharge pt on crestor. No metoprolol. As per Dr Girard pt cleared for discharge on 9/28. Reviewed discharge medications with patient; All new medications requiring new prescription sent to pharmacy of patients choice. Reviewed need for prescription for previous home medicaitons and new prescriptions sent if requested. Patient in agreement and understands. 70 y/o F w/ PMH of HTN and Asthma presented to [facility] with chest pain        Hospital course:    LHC: ostial S1 90%, mRCA 70% x1 BAIRON, EF 50%, RFA accessed and sealed w/ Perclose. Pt reported side effect with lipitor and metoprolol. Spoke with Dr Girard will discharge pt on crestor. No metoprolol. Aspirin and plavix (brilinta copay 370s, pt to start on aspirin per attending). As per Dr Girard pt cleared for discharge on 9/28. Reviewed discharge medications with patient; All new medications requiring new prescription sent to pharmacy of patients choice. Reviewed need for prescription for previous home medicaitons and new prescriptions sent if requested. Patient in agreement and understands. 70 y/o F w/ PMH of HTN and Asthma presented to [facility] with chest pain        Hospital course:    LHC: ostial S1 90%, mRCA 70% x1 BAIRON, EF 50%, RFA accessed and sealed w/ Perclose. Pt reported side effect with lipitor and metoprolol Spoke with Dr Girard will discharge pt on crestor. No metoprolol, losartan. Aspirin and plavix (brilinta copay 370s, pt to start on aspirin per attending). As per Dr Girard pt cleared for discharge on 9/28. Reviewed discharge medications with patient; All new medications requiring new prescription sent to pharmacy of patients choice. Reviewed need for prescription for previous home medicaitons and new prescriptions sent if requested. Patient in agreement and understands.

## 2019-09-28 NOTE — PROGRESS NOTE ADULT - ATTENDING COMMENTS
Patient was seen and examined,interim events noted,labs and radiology studies reviewed.  Rip Girard MD,FACC.  [address] [place] [address].  [id number].  [phone]

## 2019-09-30 ENCOUNTER — APPOINTMENT (OUTPATIENT)
Dept: CARE COORDINATION | Facility: HOME HEALTH | Age: 69
End: 2019-09-30
Payer: MEDICARE

## 2019-09-30 VITALS
SYSTOLIC BLOOD PRESSURE: 150 MMHG | OXYGEN SATURATION: 99 % | RESPIRATION RATE: 16 BRPM | DIASTOLIC BLOOD PRESSURE: 90 MMHG | HEART RATE: 82 BPM

## 2019-09-30 DIAGNOSIS — K21.9 GASTRO-ESOPHAGEAL REFLUX DISEASE W/OUT ESOPHAGITIS: ICD-10-CM

## 2019-09-30 DIAGNOSIS — Z72.89 OTHER PROBLEMS RELATED TO LIFESTYLE: ICD-10-CM

## 2019-09-30 DIAGNOSIS — Z98.62 PERIPHERAL VASCULAR ANGIOPLASTY STATUS: ICD-10-CM

## 2019-09-30 DIAGNOSIS — J45.909 UNSPECIFIED ASTHMA, UNCOMPLICATED: ICD-10-CM

## 2019-09-30 DIAGNOSIS — Z98.61 CORONARY ANGIOPLASTY STATUS: ICD-10-CM

## 2019-09-30 DIAGNOSIS — I10 ESSENTIAL (PRIMARY) HYPERTENSION: ICD-10-CM

## 2019-09-30 PROBLEM — Z00.00 ENCOUNTER FOR PREVENTIVE HEALTH EXAMINATION: Status: ACTIVE | Noted: 2019-09-30

## 2019-09-30 PROCEDURE — 99349 HOME/RES VST EST MOD MDM 40: CPT

## 2019-09-30 RX ORDER — MONTELUKAST 10 MG/1
10 TABLET, FILM COATED ORAL
Qty: 30 | Refills: 0 | Status: ACTIVE | COMMUNITY
Start: 2019-07-07

## 2019-09-30 RX ORDER — PANTOPRAZOLE 40 MG/1
40 TABLET, DELAYED RELEASE ORAL
Qty: 30 | Refills: 0 | Status: ACTIVE | COMMUNITY
Start: 2019-09-28

## 2019-09-30 RX ORDER — ALBUTEROL SULFATE 90 UG/1
108 (90 BASE) INHALANT RESPIRATORY (INHALATION)
Qty: 8 | Refills: 0 | Status: ACTIVE | COMMUNITY
Start: 2019-09-12

## 2019-09-30 RX ORDER — ROSUVASTATIN CALCIUM 10 MG/1
10 TABLET, FILM COATED ORAL
Qty: 30 | Refills: 0 | Status: ACTIVE | COMMUNITY
Start: 2019-09-28

## 2019-09-30 RX ORDER — PRASTERONE 6.5 MG/1
6.5 INSERT VAGINAL
Qty: 28 | Refills: 0 | Status: ACTIVE | COMMUNITY
Start: 2019-05-31

## 2019-09-30 RX ORDER — DESONIDE 0.5 MG/ML
0.05 LOTION TOPICAL
Qty: 59 | Refills: 0 | Status: ACTIVE | COMMUNITY
Start: 2019-08-06

## 2019-09-30 RX ORDER — FLUTICASONE PROPIONATE 50 UG/1
50 SPRAY, METERED NASAL
Qty: 16 | Refills: 0 | Status: ACTIVE | COMMUNITY
Start: 2019-04-23

## 2019-09-30 RX ORDER — LOSARTAN POTASSIUM 50 MG/1
50 TABLET, FILM COATED ORAL
Qty: 90 | Refills: 0 | Status: ACTIVE | COMMUNITY
Start: 2019-07-15

## 2019-09-30 RX ORDER — CLOPIDOGREL BISULFATE 75 MG/1
75 TABLET, FILM COATED ORAL
Qty: 30 | Refills: 0 | Status: ACTIVE | COMMUNITY
Start: 2019-09-28

## 2019-09-30 NOTE — HISTORY OF PRESENT ILLNESS
[FreeTextEntry1] : "Hospital follow up for AMI" Patient is homebound due to weakness, limited endurance following hospitalization. Seen for f/u TCM services and complaints of low BP this morning BP 96/77 and 108/81 upon waking, Pt was discharged home on home dose of losartan 50 mg which she took last night as scheduled.  Denies dizziness, syncope, light headededness, chest pain, SOB, palpitations.  Pt s/p PTCA to mRCA. Right groin insertion site c/d/i, scattered ecchymosis, no swelling, tenderness, pain, numbness/tingling reported. Pt with intermittent chest discomfort, non specific onset or relieving symptoms, + epigastric tenderness, attributes it to echocardiogram testing . Denies N/V/abd pain.   [de-identified] : Hospital Course: 68 y/o F w/ PMH of HTN and Asthma presented to [facility] with chest pain\par Hospital course:\par LHC: ostial S1 90%, mRCA 70% x1 BAIRON, EF 50%, RFA accessed and sealed w/ Perclose. Pt reported side effect with lipitor and metoprolol Spoke with Dr Girard will discharge pt on crestor. No metoprolol, losartan. Aspirin and plavix (brilinta copay 370s, pt to start on aspirin per attending). As per Dr Girard pt cleared for discharge on 9/28. Reviewed discharge medications with patient; All new medications requiring new prescription sent to pharmacy of patients choice. Reviewed need for prescription for previous home medicaitons and new prescriptions sent if requested. Patient in agreement and understands.

## 2019-09-30 NOTE — PHYSICAL EXAM
[No Acute Distress] : no acute distress [EOMI] : extraocular movements intact [PERRL] : pupils equal round and reactive to light [No JVD] : no jugular venous distention [No Respiratory Distress] : no respiratory distress  [Clear to Auscultation] : lungs were clear to auscultation bilaterally [No Accessory Muscle Use] : no accessory muscle use [Normal Rate] : normal rate  [Normal S1, S2] : normal S1 and S2 [Regular Rhythm] : with a regular rhythm [No Murmur] : no murmur heard [Pedal Pulses Present] : the pedal pulses are present [No Edema] : there was no peripheral edema [Soft] : abdomen soft [Non-distended] : non-distended [Normal] : no joint swelling and grossly normal strength and tone [Normal Bowel Sounds] : normal bowel sounds [No Focal Deficits] : no focal deficits [Alert and Oriented x3] : oriented to person, place, and time [Normal Gait] : normal gait [de-identified] : + epigastric tenderness [de-identified] : right groin insertion site c/d/i, no erythema, tenderness, swelling

## 2022-06-19 NOTE — H&P CARDIOLOGY - NEGATIVE CARDIOVASCULAR SYMPTOMS
no palpitations/no orthopnea/no paroxysmal nocturnal dyspnea/no peripheral edema/no claudication/no dyspnea on exertion
impairments found/functional limitations in following categories/risk reduction/prevention/rehab potential/therapy frequency/predicted duration of therapy intervention/anticipated discharge recommendation

## 2022-11-17 NOTE — ED PROVIDER NOTE - CPE EDP NEURO NORM
Home Care Verbal Orders    Caller Name:Ivy  Agency: [facility]    Orders Requested:   Occupational Therapy (OT) once a week for one week; twice a week for two weeks; once a week for three weeks     Martha     normal...

## 2023-11-22 NOTE — ED ADULT NURSE NOTE - NSSISCREENINGQ1_ED_A_ED
HPI: as per patient provided history  Exam: N/A (electronic visit)  ASSESSMENT/PLAN:  1. Urinary tract infection without hematuria, site unspecified    See note   Tx based on previous urine cx results with:     - cephALEXin (KEFLEX) 250 MG capsule; Take 1 capsule by mouth 3 times daily for 7 days  Dispense: 21 capsule; Refill: 0       Patient instructed to call the office if worsens, or fails to improve as anticipated. 11-20 minutes were spent on the digital evaluation and management of this patient.
No

## 2024-06-15 NOTE — PATIENT PROFILE ADULT - NSTOBACCONEVERSMOKERY/N_GEN_A
Medical Device)  Outcome: Not Progressing      No

## 2024-08-02 NOTE — PATIENT PROFILE ADULT - CHOOSE INDICATION TO IMMUNIZE (AN ORDER WILL BE GENERATED WHEN THIS NOTE IS SAVED):
[facility]  Botulinum Toxin Procedure    Phoebe Main MD  Headache Neurology    August 2, 2024    Procedure:  OnabotulinumtoxinA injections for chronic migraine  Indication:  Chronic migraine    Ms. Oliva suffers from severe intractable headaches.  She was referred by Corinna Farfan NP for onabotulinumtoxinA injections for headache.  Risks, benefits, and alternatives were discussed.  All questions were answered and consent given.  She decided to proceed with the injections.      Her baseline headaches are a constant, deep ache and pressure accompanied by photophobia, phonophobia, nausea, lasting 1-2 days in duration.     Prior to initiation of botulinum toxin injections, Deepika reported 20/30 headache days per month, with 8/30 severe headache days per month. Her headaches are quite disabling and often interfere with her ability to function normally.    Date of last injections: 5/3/2024    Ms. Oliva reports 2-3 headache days per month currently, with 2 severe headache days per month.  She has noticed a wearing off phenomenon prior to this round of botulinum toxin injections, lasting 4 weeks.    Botulinum toxin injections have improved their functioning. She was able to travel, made plans and went out to lunch. She is up from 3am-7am doing activities around the house and then nap thereafter.    Deepika reports the following benefits of botulinum toxin injections from their last round: She was able to enjoy the holidays and go out with family and stay up late with them.      Previously tried duloxetine, amitriptyline, and verapamil for prevention of chronic migraine without benefit.     She currently takes topiramate for headache prevention.    Ms. Umanas pain was assessed prior to the procedure.  She rated her pain today as 3 out of 10.    Procedural Pause: Procedural pause was conducted to verify correct patient identity, procedure to be performed, correct side and site, correct patient  position, and special requirements. Appropriate hand hygiene was utilized, and each injection site was prepped with alcohol wipe or Chloraprep swab.     Procedure Details: 200 units of onabotulinumtoxinA was diluted in 4 mL 0.9% normal saline. A total of 200 units of onabotulinumtoxinA were injected using 30 gauge 0.5 in needles into the muscles listed below. 0 units of onabotulinumtoxinA were wasted.     Injection Sites: Total = 200 units onabotulinumtoxinA      and Procerus muscles - 5 units into the left and right corrugators and 5 units into the procerus (15 units total)    Frontalis muscles - 5 units into the left superior frontalis and 5 units into the right superior frontalis (2 injection sites per muscle) (10 units total)    Temporalis muscles - 25 units into the left temporalis muscle and 25 units into the right temporalis muscle (4 injection sites per muscle) (50 units total)    Occipitalis muscles - 25 units into the left occipitalis muscle and 25 units into the right occipitalis muscle (4 injection sites per muscle) (50 units total)    Splenius Capitis muscles - 12.5 units into the left splenius capitis muscle and 12.5 units into the right splenius capitis muscle (2 injection sites per muscle, divided into 2/3 anteriorly and 1/3 posteriorly) (25 units total)      **Trapezius muscles - 15 units into the left trapezius muscle and 15 units into the right trapezius muscle (3 injection sites per muscle, divided 5 units, 10 units, 10 units, medial to lateral) ( 30 units total)    **Masseters: 5 units in the left masseter muscle and 5 units in the right masseter muscle (1 injection site per muscle)    Ms. Oliva tolerated the procedure well without immediate complications.  She will follow up in clinic for assessment of the effectiveness of treatment.  She did not report any change in her pain level after the botulinumtoxinA injection procedure.    Phoebe Main MD  Headache  Neurology  [facility]    Patient is not pregnant (male or female)